# Patient Record
Sex: FEMALE | Race: BLACK OR AFRICAN AMERICAN
[De-identification: names, ages, dates, MRNs, and addresses within clinical notes are randomized per-mention and may not be internally consistent; named-entity substitution may affect disease eponyms.]

---

## 2018-09-28 ENCOUNTER — HOSPITAL ENCOUNTER (OUTPATIENT)
Dept: HOSPITAL 92 - ULT | Age: 18
Discharge: HOME | End: 2018-09-28
Attending: FAMILY MEDICINE
Payer: COMMERCIAL

## 2018-09-28 DIAGNOSIS — K21.9: ICD-10-CM

## 2018-09-28 DIAGNOSIS — K82.8: ICD-10-CM

## 2018-09-28 DIAGNOSIS — R11.0: Primary | ICD-10-CM

## 2018-09-28 DIAGNOSIS — K76.0: ICD-10-CM

## 2018-09-28 PROCEDURE — 76705 ECHO EXAM OF ABDOMEN: CPT

## 2018-09-28 PROCEDURE — 74247: CPT

## 2018-09-28 NOTE — RAD
UPPER GI:

 

Date:  09/28/18 

 

HISTORY:  

Nausea. 

 

EXPOSURE:

30.381 Gy*cm^2. 

3.2 minutes of fluoroscopic time. 

 

FINDINGS:

A double contrast upper GI was performed with air and barium. Esophageal motility is normal. No esoph
ageal abnormality is seen. No intrinsic or extrinsic esophageal abnormality is seen. There is no evid
ence of hiatal hernia. 

 

The stomach is normal in size and appearance without focal mucosal abnormality. Contrast passed into 
the duodenum without difficult. No duodenal abnormality is seen. 

 

A trace amount of gastroesophageal reflux was seen during the examination into the distal thoracic es
ophagus. 

 

IMPRESSION: 

Mild gastroesophageal reflux disease. 

 

 

POS: [facility]

## 2018-09-28 NOTE — ULT
RIGHT UPPER QUADRANT ULTRASOUND: 

 

Comparison: None. 

 

History: Abdominal pain, nausea. 

 

Technique: Multiplanar grayscale and color doppler images were obtained in a right upper quadrant abd
ominal ultrasound.

 

FINDINGS: 

The liver demonstrates increased echogenicity without focal lesions or intrahepatic ductal dilatation
. There is sludge within the gallbladder. There is no gallbladder wall thickening or pericholecystic 
fluid. The common bile duct is normal measuring 5 mm. 

 

The visualized portions of the pancreas are unremarkable. The right kidney is normal in echogenicity 
without hydronephrosis or calculus measuring 9.7 cm in length. 

 

IMPRESSION: 

1. Fatty liver. 

2. Gallbladder sludge. 

 

POS: [facility]